# Patient Record
Sex: FEMALE | Race: OTHER | NOT HISPANIC OR LATINO
[De-identification: names, ages, dates, MRNs, and addresses within clinical notes are randomized per-mention and may not be internally consistent; named-entity substitution may affect disease eponyms.]

---

## 2021-08-19 ENCOUNTER — APPOINTMENT (OUTPATIENT)
Dept: ORTHOPEDIC SURGERY | Facility: CLINIC | Age: 26
End: 2021-08-19

## 2021-09-27 ENCOUNTER — NON-APPOINTMENT (OUTPATIENT)
Age: 26
End: 2021-09-27

## 2021-10-01 ENCOUNTER — APPOINTMENT (OUTPATIENT)
Dept: ORTHOPEDIC SURGERY | Facility: CLINIC | Age: 26
End: 2021-10-01
Payer: COMMERCIAL

## 2021-10-01 VITALS
HEIGHT: 70 IN | WEIGHT: 150 LBS | BODY MASS INDEX: 21.47 KG/M2 | TEMPERATURE: 98.4 F | OXYGEN SATURATION: 98 % | HEART RATE: 64 BPM | DIASTOLIC BLOOD PRESSURE: 70 MMHG | SYSTOLIC BLOOD PRESSURE: 110 MMHG

## 2021-10-01 DIAGNOSIS — S83.282D OTHER TEAR OF LATERAL MENISCUS, CURRENT INJURY, LEFT KNEE, SUBSEQUENT ENCOUNTER: ICD-10-CM

## 2021-10-01 PROCEDURE — 99213 OFFICE O/P EST LOW 20 MIN: CPT

## 2021-10-05 NOTE — END OF VISIT
[FreeTextEntry3] : All medical record entries made by VIOLETA Gibbs, acting as a scribe for this encounter under the direction of Kisohr Seymour MD . I have reviewed the chart and agree that the record accurately reflects my personal performance of the history, physical exam, assessment and plan. I have also personally directed, reviewed, and agreed with the chart.

## 2021-10-05 NOTE — PHYSICAL EXAM
[de-identified] : The patient is a well developed, well nourished female in no apparent distress. She is alert and oriented X 3 with a pleasant mood and appropriate affect. \par \par On physical examination of the left knee, her ROM is 0-120 degrees The patient walks with a normal gait and stands in neutral alignment. There is trace effusion. No warmth or erythema is noted. The patella is non tender to palpation medially or laterally. There is no crepitus noted. The apprehension and grind tests are negative. The extensor mechanism is intact. There is lateral joint line tenderness. The Lan sign is absent. The Lachman and pivot shift tests are negative. There is no varus or valgus laxity at 0 or 30 degrees. No posterolateral or anteromedial laxity is noted. No masses are palpable. No other soft tissue or bony tenderness is noted. Quadriceps weakness is noted. Neurovascular function is intact.   [de-identified] : MRi of the left knee shows an intact ACL graft--there is a longitudinal tear in the posterior horn of the lateral meniscus

## 2021-10-05 NOTE — DISCUSSION/SUMMARY
[de-identified] : Veronica has a symptomatic lateral meniscus tear that is impacting her ADLs. She would benefit from arthroscopy. The risks, benefits and potential complications of surgery were discussed at length and all questions were answered. She will schedule at her earliest convenience. She will call if any issues arise.

## 2021-10-05 NOTE — HISTORY OF PRESENT ILLNESS
[de-identified] : Veronica returns today for evaluation of her left knee. She reports that over the summer she noted increasing posterolateral knee pain and swelling. She has pain with deep knee flexion and with stairs. She cut down on exercise without improvement. She cannot take NSAIDS due to kidney issues. She has been icing and resting without much relief. She was seen by a local doctor in [place] and had a Doppler which was negative for DVT and then a subsequent MRI which shows a lateral meniscus tear. She denies any locking or buckling.

## 2024-12-17 ENCOUNTER — APPOINTMENT (OUTPATIENT)
Dept: ORTHOPEDIC SURGERY | Facility: CLINIC | Age: 29
End: 2024-12-17